# Patient Record
Sex: MALE | Race: WHITE | Employment: FULL TIME | URBAN - METROPOLITAN AREA
[De-identification: names, ages, dates, MRNs, and addresses within clinical notes are randomized per-mention and may not be internally consistent; named-entity substitution may affect disease eponyms.]

---

## 2018-05-02 ENCOUNTER — HOSPITAL ENCOUNTER (EMERGENCY)
Age: 28
Discharge: HOME | End: 2018-05-02

## 2018-05-02 VITALS
RESPIRATION RATE: 16 BRPM | HEIGHT: 71 IN | HEART RATE: 76 BPM | WEIGHT: 170 LBS | DIASTOLIC BLOOD PRESSURE: 82 MMHG | SYSTOLIC BLOOD PRESSURE: 138 MMHG | TEMPERATURE: 98.1 F | BODY MASS INDEX: 23.8 KG/M2 | OXYGEN SATURATION: 98 %

## 2018-05-02 DIAGNOSIS — K08.89 PAIN, DENTAL: Primary | ICD-10-CM

## 2018-05-02 PROCEDURE — 6370000000 HC RX 637 (ALT 250 FOR IP): Performed by: PHYSICIAN ASSISTANT

## 2018-05-02 PROCEDURE — 99282 EMERGENCY DEPT VISIT SF MDM: CPT

## 2018-05-02 RX ORDER — IBUPROFEN 800 MG/1
800 TABLET ORAL ONCE
Status: COMPLETED | OUTPATIENT
Start: 2018-05-02 | End: 2018-05-02

## 2018-05-02 RX ADMIN — IBUPROFEN 800 MG: 800 TABLET ORAL at 02:33

## 2018-05-02 ASSESSMENT — PAIN DESCRIPTION - FREQUENCY: FREQUENCY: CONTINUOUS

## 2018-05-02 ASSESSMENT — PAIN SCALES - GENERAL
PAINLEVEL_OUTOF10: 8
PAINLEVEL_OUTOF10: 8

## 2018-05-02 ASSESSMENT — PAIN DESCRIPTION - ORIENTATION: ORIENTATION: RIGHT;UPPER;LOWER

## 2018-05-02 ASSESSMENT — PAIN DESCRIPTION - PAIN TYPE: TYPE: ACUTE PAIN

## 2018-05-02 ASSESSMENT — PAIN DESCRIPTION - LOCATION: LOCATION: TEETH

## 2018-05-02 ASSESSMENT — PAIN DESCRIPTION - DESCRIPTORS: DESCRIPTORS: STABBING;SHARP;ACHING

## 2022-06-17 ENCOUNTER — APPOINTMENT (OUTPATIENT)
Dept: GENERAL RADIOLOGY | Age: 32
End: 2022-06-17
Payer: COMMERCIAL

## 2022-06-17 ENCOUNTER — HOSPITAL ENCOUNTER (EMERGENCY)
Age: 32
Discharge: HOME | End: 2022-06-17
Attending: EMERGENCY MEDICINE
Payer: COMMERCIAL

## 2022-06-17 VITALS
HEART RATE: 71 BPM | HEIGHT: 71 IN | DIASTOLIC BLOOD PRESSURE: 77 MMHG | RESPIRATION RATE: 12 BRPM | OXYGEN SATURATION: 98 % | SYSTOLIC BLOOD PRESSURE: 110 MMHG | TEMPERATURE: 98.1 F | BODY MASS INDEX: 26.32 KG/M2 | WEIGHT: 188 LBS

## 2022-06-17 DIAGNOSIS — S61.211A LACERATION OF LEFT INDEX FINGER WITHOUT FOREIGN BODY WITHOUT DAMAGE TO NAIL, INITIAL ENCOUNTER: Primary | ICD-10-CM

## 2022-06-17 PROCEDURE — 6360000002 HC RX W HCPCS: Performed by: EMERGENCY MEDICINE

## 2022-06-17 PROCEDURE — 73130 X-RAY EXAM OF HAND: CPT

## 2022-06-17 PROCEDURE — 12002 RPR S/N/AX/GEN/TRNK2.6-7.5CM: CPT

## 2022-06-17 PROCEDURE — 2500000003 HC RX 250 WO HCPCS: Performed by: EMERGENCY MEDICINE

## 2022-06-17 PROCEDURE — 6370000000 HC RX 637 (ALT 250 FOR IP): Performed by: EMERGENCY MEDICINE

## 2022-06-17 PROCEDURE — 2580000003 HC RX 258: Performed by: EMERGENCY MEDICINE

## 2022-06-17 PROCEDURE — 99284 EMERGENCY DEPT VISIT MOD MDM: CPT

## 2022-06-17 PROCEDURE — 96374 THER/PROPH/DIAG INJ IV PUSH: CPT

## 2022-06-17 RX ORDER — HYDROCODONE BITARTRATE AND ACETAMINOPHEN 5; 325 MG/1; MG/1
1 TABLET ORAL ONCE
Status: COMPLETED | OUTPATIENT
Start: 2022-06-17 | End: 2022-06-17

## 2022-06-17 RX ORDER — 0.9 % SODIUM CHLORIDE 0.9 %
1000 INTRAVENOUS SOLUTION INTRAVENOUS ONCE
Status: COMPLETED | OUTPATIENT
Start: 2022-06-17 | End: 2022-06-17

## 2022-06-17 RX ORDER — TETANUS AND DIPHTHERIA TOXOIDS ADSORBED 2; 2 [LF]/.5ML; [LF]/.5ML
0.5 INJECTION INTRAMUSCULAR ONCE
Status: DISCONTINUED | OUTPATIENT
Start: 2022-06-17 | End: 2022-06-17 | Stop reason: HOSPADM

## 2022-06-17 RX ORDER — LIDOCAINE HYDROCHLORIDE 10 MG/ML
5 INJECTION, SOLUTION INFILTRATION; PERINEURAL ONCE
Status: COMPLETED | OUTPATIENT
Start: 2022-06-17 | End: 2022-06-17

## 2022-06-17 RX ORDER — HYDROCODONE BITARTRATE AND ACETAMINOPHEN 5; 325 MG/1; MG/1
1 TABLET ORAL EVERY 6 HOURS PRN
Qty: 20 TABLET | Refills: 0 | Status: SHIPPED | OUTPATIENT
Start: 2022-06-17 | End: 2022-06-22

## 2022-06-17 RX ORDER — CEPHALEXIN 500 MG/1
500 CAPSULE ORAL 4 TIMES DAILY
Qty: 40 CAPSULE | Refills: 0 | Status: SHIPPED | OUTPATIENT
Start: 2022-06-17 | End: 2022-06-27

## 2022-06-17 RX ADMIN — HYDROCODONE BITARTRATE AND ACETAMINOPHEN 1 TABLET: 5; 325 TABLET ORAL at 09:48

## 2022-06-17 RX ADMIN — SODIUM CHLORIDE 1000 ML: 9 INJECTION, SOLUTION INTRAVENOUS at 09:49

## 2022-06-17 RX ADMIN — LIDOCAINE HYDROCHLORIDE 5 ML: 10 INJECTION, SOLUTION INFILTRATION; PERINEURAL at 09:50

## 2022-06-17 RX ADMIN — CEFAZOLIN 2000 MG: 2 INJECTION, POWDER, FOR SOLUTION INTRAMUSCULAR; INTRAVENOUS at 09:50

## 2022-06-17 ASSESSMENT — PAIN SCALES - GENERAL
PAINLEVEL_OUTOF10: 3

## 2022-06-17 ASSESSMENT — PAIN DESCRIPTION - ORIENTATION
ORIENTATION: LEFT
ORIENTATION: LEFT

## 2022-06-17 ASSESSMENT — PAIN DESCRIPTION - LOCATION
LOCATION: FINGER (COMMENT WHICH ONE)
LOCATION: FINGER (COMMENT WHICH ONE)

## 2022-06-17 ASSESSMENT — PAIN DESCRIPTION - FREQUENCY: FREQUENCY: CONTINUOUS

## 2022-06-17 ASSESSMENT — PAIN DESCRIPTION - PAIN TYPE: TYPE: ACUTE PAIN

## 2022-06-17 ASSESSMENT — PAIN DESCRIPTION - DESCRIPTORS
DESCRIPTORS: SORE
DESCRIPTORS: ACHING

## 2022-06-17 ASSESSMENT — PAIN - FUNCTIONAL ASSESSMENT
PAIN_FUNCTIONAL_ASSESSMENT: PREVENTS OR INTERFERES SOME ACTIVE ACTIVITIES AND ADLS
PAIN_FUNCTIONAL_ASSESSMENT: NONE - DENIES PAIN
PAIN_FUNCTIONAL_ASSESSMENT: 0-10

## 2022-06-17 ASSESSMENT — PAIN DESCRIPTION - ONSET: ONSET: ON-GOING

## 2022-06-17 NOTE — ED PROVIDER NOTES
HPI:  6/17/22, Time: 9:32 AM EDT         Johan Baker is a 27 y.o. male presenting to the ED for left index finger laceration (palmar surface) after getting it caught in a chain (large one) at work, beginning this morning. He is left-handed. He denies paresthesias or weakness, inability to move his finger or hand, any other complaint or injury at this time. He denies fingernail trauma. He denies allergy or intolerance to any medications. The complaint has been persistent, moderate in severity, and worsened by nothing. Patient denies any other complaints or injuries at this time. ROS:   A complete review of systems was performed and all pertinent positives and negatives are stated within HPI, all other systems reviewed and are negative.      --------------------------------------------- PAST HISTORY ---------------------------------------------  Past Medical History:  has no past medical history on file. Past Surgical History:  has no past surgical history on file. Social History:  reports that he has been smoking cigarettes. He has been smoking about 0.50 packs per day. He has never used smokeless tobacco. He reports current alcohol use. He reports that he does not use drugs. Family History: family history is not on file. The patients home medications have been reviewed. Allergies: Patient has no known allergies. ----------------------------------------PHYSICAL EXAM--------------------------------------  Constitutional:  Well developed, well nourished, no acute distress, non-toxic appearance   Eyes:  PERRL, conjunctiva normal, EOMI  HENT:  Atraumatic, external ears normal, nose normal, oropharynx moist. Neck- normal range of motion, no nuchal rigidity   Respiratory:  No respiratory distress   Cardiovascular:  Normal rate, normal rhythm. Radial pulses 2+ bilaterally. Left hand forearm and finger components warm and well perfused.   Components are soft, cap refill less than 3 seconds. Musculoskeletal:  No edema, no tenderness, no deformities. She has full range of motion of left wrist, hand, all finger DIP, PIP and MCP joints including left thumb IP joint and MCP joint without difficulty or pain. Fingernails intact without trauma. Integument:  Well hydrated. Adequate perfusion. Palmar aspect of left index finger with large semicircular shaped laceration creating a large skin flap 1-1/2 cm across by 2 cm in length that exposes subcu tissue when folded back. Flap of skin is dusky and pale. No exposed bone or tendon. Neurologic:  Alert & oriented x 3, CN 2-12 normal, no focal deficits noted. Normal gait. Left median, radial and ulnar nerves sensation intact to light touch and strength 5/5. Recurrent branch of left median nerve intact. Psychiatric:  Speech and behavior appropriate       -------------------------------------------------- RESULTS -------------------------------------------------  I have personally reviewed all laboratory and imaging results for this patient. Results are listed below. LABS:  No results found for this visit on 06/17/22. RADIOLOGY:  Interpreted by Radiologist.  XR HAND LEFT (MIN 3 VIEWS)   Final Result   Soft tissue laceration. No osseous findings. See above.             ------------------------- NURSING NOTES AND VITALS REVIEWED ---------------------------  The nursing notes within the ED encounter and vital signs as below have been reviewed by myself. /77   Pulse 71   Temp 98.1 °F (36.7 °C)   Resp 12   Ht 5' 11\" (1.803 m)   Wt 188 lb (85.3 kg)   SpO2 98%   BMI 26.22 kg/m²   Oxygen Saturation Interpretation: Normal      The patients available past medical records and past encounters were reviewed.         ------------------------------ ED COURSE/MEDICAL DECISION MAKING----------------------  Medications   diptheria-tetanus toxoids (DECAVAC) 2-2 LF/0.5ML injection 0.5 mL (0.5 mLs IntraMUSCular Not Given 6/17/22 3695) ceFAZolin (ANCEF) 2,000 mg in sterile water 20 mL IV syringe (2,000 mg IntraVENous Given 6/17/22 0950)   0.9 % sodium chloride bolus (0 mLs IntraVENous Stopped 6/17/22 1049)   HYDROcodone-acetaminophen (NORCO) 5-325 MG per tablet 1 tablet (1 tablet Oral Given 6/17/22 0948)   lidocaine 1 % injection 5 mL (5 mLs IntraDERmal Given 6/17/22 0950)           Procedures:     LACERATION REPAIR  PROCEDURE NOTE:  Unless otherwise indicated, this procedure was done or directly supervised by Patrick De La Rosa NP. Time: 11:30 AM EDT  Laceration #: 1. Location: left inde finger  Length: 3 cm. The wound area was cleansed with sterile saline, povidone iodine,and SkinTegrity shur-clens and draped in a sterile fashion. The wound area was anesthetized with Lidocaine 1% without epinephrine (ring block)  WOUND COMPLEXITY:  Debridement: None. Undermining: None. Wound Margins Revised: None. Flaps Aligned: yes. The wound was explored with the following results no foreign body or tendon injury seen. The wound was closed with 3-0 Ethilon using interrupted sutures. Dressing:  a bandage and splint was placed. Total number suture: 9        Medical Decision Making:    IV Ancef given, IV saline, PO norco for pain and tetanus updated. Digit and then splinted after sutured by nurse practitioner, see procedure note as above. Wound cleansed thoroughly by RN. He is neurovascularly intact. He will be written for off work until cleared by provider and sutures removed and wound well-healed. He will be discharged home with Keflex. He will also be discharged home with [place] for breakthrough pain with safe opiate use and driving restrictions reviewed. Wound care reviewed. Wound healing process also reviewed. Patient was explicitly instructed on specific signs and symptoms on which to return to the emergency room for. Patient was instructed to return to the ER for any new or worsening symptoms.  Additional discharge instructions were given verbally. All questions were answered. Patient is comfortable and agreeable with discharge plan. Patient in no acute distress and non-toxic in appearance. This patient's ED course included: re-evaluation prior to disposition, IV medications and a personal history and physicial eaxmination    This patient has remained hemodynamically stable, improved and been closely monitored during their ED course. Re-Evaluations:  Time: 12:12 PM   Re-evaluation. Patients symptoms are improving  Repeat physical examination is improved      Consultations:   none    Critical Care: none    I, Beba Segundo DO, am the Primary Provider of Record    Counseling: The emergency provider has spoken with the patient and spouse/SO and discussed todays results, in addition to providing specific details for the plan of care and counseling regarding the diagnosis and prognosis. Questions are answered at this time and they are agreeable with the plan.    --------------------------- IMPRESSION AND DISPOSITION ---------------------------------    IMPRESSION  1.  Laceration of left index finger without foreign body without damage to nail, initial encounter        DISPOSITION  Disposition: Discharge to home  Patient condition is stable             Susan Bonds DO  06/17/22 1212

## 2022-08-23 ENCOUNTER — HOSPITAL ENCOUNTER (OUTPATIENT)
Dept: OCCUPATIONAL THERAPY | Age: 32
Setting detail: THERAPIES SERIES
Discharge: HOME | End: 2022-08-23
Payer: COMMERCIAL

## 2022-08-23 PROCEDURE — 97165 OT EVAL LOW COMPLEX 30 MIN: CPT | Performed by: OCCUPATIONAL THERAPIST

## 2022-08-23 PROCEDURE — 97140 MANUAL THERAPY 1/> REGIONS: CPT | Performed by: OCCUPATIONAL THERAPIST

## 2022-08-23 NOTE — PROGRESS NOTES
OCCUPATIONAL THERAPY INITIAL EVALUATION    [facility] [address] THERAPY  [address] [address]  Dept: [address]: [phone]   [facility] Fax: [phone]    Date:  2022  Initial Evaluation Date: 22   Evaluating Therapist: Erin Bonilla OT    Patient Name:  Timmy Montilla    :  1990    Restrictions/Precautions:   Low fall risk  Diagnosis:  Laceration of Index Finger (S61.211A       Date of Surgery/Injury:     Insurance/Certification information:  [facility] C-9 (3x/week x 4 weeks) service dates 22 to 22  [facility]# [id number],  [facility]#: [id number]  Plan of care signed (Y/N): N  Visit# / total visits:     Referring Practitioner:  John Ragsdale DO  Specific Practitioner Orders: Occupational Therapy L Hand- Evaluate and Treat 3x/week for 4 weeks    Assessment of current deficits   [] Functional mobility  [] ADLs  [x] Strength   [] Cognition   [] Functional transfers   [x] IADLs  [] Safety Awareness  [] Endurance   [x] Fine Motor Coordination  [] Balance  [] Vision/perception  [x] Sensation    [] Gross Motor Coordination [x] ROM  [x] Pain   [x] Edema    [x] Scar Adhesion/Skin Integrity     OT PLAN OF CARE   OT POC based on physician orders, patient diagnosis and results of clinical assessment    Frequency/Duration: OT 2-3x/week or 12 visits  Specific OT Treatment to include:     [x] Instruction in HEP                   Modalities:  [x] Therapeutic Exercise        [] Ultrasound               [] Electrical Stimulation/Attended  [x] PROM/Stretching                    [x] Fluidotherapy          []  Paraffin                   [x] AAROM  [x] AROM                 [] Iontophoresis:   [x] Tendon Glides                                               [] Neuromuscular Re-Ed            [] ADL/IADL re-training    [x] Therapeutic Activity       [x] Pain Management with/without modalities PRN                 [x] Manual Therapy                      [] Splinting                      [x] Scar Management                   []Joint Protection/Training  []Ergonomics                             [x] Joint Mobilization        [] Adaptive Equipment Assessment/Training                             [x] Manual Edema Mobilization   [] Myofascial Release                 [x] Energy Conservation/Work Simplification  [x] GM/FM Coordination       [x] Safety retraining/education per  individual diagnosis/goals  [x] Desensitization       Patient Specific Goal: To be able to gasp and lift 100# using L hand                             GOALS (Long term same as Short term): 4 weeks  1) Patient will demonstrate good understanding of home program (exercises/activities/diagnosis/prognosis/goals) with good accuracy. 2) Patient will demonstrate increased active range of motion of their left hand to [facility] for ADL/IADL completion. 3) Patient will demonstrate increased /pinch strength of at least 40# / 5# lateral pinch of their Left hand for IADLs/work related tasks. 4) Patient to report decreased pain in their affected Left upper extremity from 7/10 to 2/10 or less with resistive functional use. 5) Patient to report a decrease in hypersensitivity from 80% to 20% or less in their Left IF. 6) Patient will be knowledgeable of edema control techniques as evident with decreases from mild to trace/none. 7) Patient will demonstrate a non-tender/non-adherent scar. Past Medical History: No past medical history on file. Past Surgical History: No past surgical history on file. Reason for Referral: Patient sustained injury/laceration to L hand IF on 6/17/22. His L IF got caught in a large chain at work. X-rays of left hand (-). The wound was explored and closed with 9 sutures. Patient has experienced persistent pain, hypersensitivity/paresthesia and decreased ROM L IF. He is now referred to OP OT to decrease pain/increase functional use L hand.  Patient is left dominant     Home Living: Patient lives w/ his family. Prior Level of Function: Prior to injury, patient was Independent w/ ADLs/IADLs/Work/Driving/Leisure. Cognition:   Alert/Oriented x3     IADL STATUS:   Ind Mod I Min A Mod A Max A Dep Other   Homemaking Responsibility  x        Shopping Responsibility:  x        Mode of Transportation: x         Leisure & Hobbies:     x     Work:  x     x     Comments: Patient reported that since his injury, he has been working in the office- able to complete tasks w/ modifications. He does not feel that he is strong enough to work in [employer] which involves heavy lifting/grasping. Patient is able to complete basic household tasks w/ modifications- cross dominance/limited use of index finger. Patient has not been able to resume leisure tasks- golf and bow hunting. ADL STATUS:   Ind Mod I Min A Mod A Max A Dep Other   Feeding: x         Grooming: x         Bathing: x         UE Dressing: x         LE Dressing: x         Toileting: x         Transfers: x           Comments: Patient is able to complete basic ADLs w/ modifications    Pain Level: Pain fluctuates but can be as high as 7/10, aching, throbbing, tight (pulling), uncomfortable, variable intensity, and with paresthesia    UE Assessment:  Hand Dominance is Left   LUE AROM: WNL except for  IF MP: 0-90*  IF PIP: 0-93*  IF: DIP 0-70*    Comment: Decreased hook fist d/t tightness    Sensation: Diminished protective sense lateral/distal aspect of scar, hypersensitivity medial aspect    Skin: Incision site tissue hard/brawny.  Decrease scar mobility    Tone: normal  Vision / Perception: WFL  Edema Description: Minimal edema left IF     Dynamometer (setting 2):     Left: 44# (Norm 110#)     Right: 136# (Norm 121#)   Pinch Meter:   Lateral: Left= 4#,Right= 15#    Palmar 3 point: Left= 14#, Right= 15#  9 Hole Peg Test:   Left: 24 seconds   Right: 22 seconds        Intervention: Instruction/training on tendon glides and there-putty exercises and certify that the services are required and that they will be provided while the patient is under my care. Physician's Comments/Revisions:                   Physicians's Printed Name:  Roula Bourne DO                                   Physician's Signature:                                                               Date:      Please review Patient's OT evaluation and if you agree sign/date and fax back to us at our [address] [facility] Fax: [phone].  Thank you for your referral!

## 2022-08-25 ENCOUNTER — HOSPITAL ENCOUNTER (OUTPATIENT)
Dept: OCCUPATIONAL THERAPY | Age: 32
Setting detail: THERAPIES SERIES
Discharge: HOME | End: 2022-08-25
Payer: COMMERCIAL

## 2022-08-25 PROCEDURE — 97140 MANUAL THERAPY 1/> REGIONS: CPT | Performed by: OCCUPATIONAL THERAPIST

## 2022-08-25 PROCEDURE — 97530 THERAPEUTIC ACTIVITIES: CPT | Performed by: OCCUPATIONAL THERAPIST

## 2022-08-25 PROCEDURE — 97110 THERAPEUTIC EXERCISES: CPT | Performed by: OCCUPATIONAL THERAPIST

## 2022-08-25 NOTE — PROGRESS NOTES
OCCUPATIONAL THERAPY PROGRESS NOTE    [facility] [address] THERAPY  [address] [address]  [address]: [address]: [phone]   [facility] Fax: [phone]    Date:  2022  Initial Evaluation Date: 22   Evaluating Therapist: Amor Still OT    Patient Name:  Joellen Patton    :  1990    Restrictions/Precautions:   Low fall risk  Diagnosis:  Laceration of Index Finger (S61.211A       Date of Surgery/Injury: 74    Insurance/Certification information:  [facility] C-9 (3x/week x 4 weeks) service dates 22 to 22  [facility]# [id number],  [facility]#: [id number]  Plan of care signed (Y/N): N  Visit# / total visits:     Referring Practitioner:  Nacho Mcknight DO  Specific Practitioner Orders: Occupational Therapy L Hand- Evaluate and Treat 3x/week for 4 weeks    Assessment of current deficits   [] Functional mobility  [] ADLs  [x] Strength   [] Cognition   [] Functional transfers   [x] IADLs  [] Safety Awareness  [] Endurance   [x] Fine Motor Coordination  [] Balance  [] Vision/perception  [x] Sensation    [] Gross Motor Coordination [x] ROM  [x] Pain   [x] Edema    [x] Scar Adhesion/Skin Integrity     OT PLAN OF CARE   OT POC based on physician orders, patient diagnosis and results of clinical assessment    Frequency/Duration: OT 2-3x/week or 12 visits  Specific OT Treatment to include:     [x] Instruction in HEP                   Modalities:  [x] Therapeutic Exercise        [] Ultrasound               [] Electrical Stimulation/Attended  [x] PROM/Stretching                    [x] Fluidotherapy          []  Paraffin                   [x] AAROM  [x] AROM                 [] Iontophoresis:   [x] Tendon Glides                                               [] Neuromuscular Re-Ed            [] ADL/IADL re-training    [x] Therapeutic Activity       [x] Pain Management with/without modalities PRN                 [x] Manual Therapy                      [] Splinting [x] Scar Management                   []Joint Protection/Training  []Ergonomics                             [x] Joint Mobilization        [] Adaptive Equipment Assessment/Training                             [x] Manual Edema Mobilization   [] Myofascial Release                 [x] Energy Conservation/Work Simplification  [x] GM/FM Coordination       [x] Safety retraining/education per  individual diagnosis/goals  [x] Desensitization       Patient Specific Goal: To be able to gasp and lift 100# using L hand                             GOALS (Long term same as Short term): 4 weeks  1) Patient will demonstrate good understanding of home program (exercises/activities/diagnosis/prognosis/goals) with good accuracy. 2) Patient will demonstrate increased active range of motion of their left hand to [facility] for ADL/IADL completion. 3) Patient will demonstrate increased /pinch strength of at least 40# / 5# lateral pinch of their Left hand for IADLs/work related tasks. 4) Patient to report decreased pain in their affected Left upper extremity from 7/10 to 2/10 or less with resistive functional use. 5) Patient to report a decrease in hypersensitivity from 80% to 20% or less in their Left IF. 6) Patient will be knowledgeable of edema control techniques as evident with decreases from mild to trace/none. 7) Patient will demonstrate a non-tender/non-adherent scar. Pain Level: 4 on scale of 1-10, aching, uncomfortable, and with paresthesia. Decreased hypersensitivity    Subjective: Patient reported that he is trying to decrease guarding of his IF     Objective:  Updated POC to be completed by 9/23/22. INTERVENTION: COMPLETED: SPECIFICS/COMMENTS:   Modality:               AROM:     Hand x Tendon glides- slight increase in L IF PIP flex.  Hook fist remains limited        AAROM:     Isolated IF x Blocked and unblocked MP, PIP, and DIP for IF   Hand x    PROM/Stretching:     Isloated IF x Scar Mass/Edema Control:     Manual edema mobilization x Completed throughout   Scar Management x Scar massage - circular and cross friction. Significant softening of tissue. Desensitization x Rolling over green thera-bar  Tapping w/ varied textures  Tossing/Catching hand to hand 3.3# ball  Therapy spheres x 5 minutes (clockwise/counterclockwise)   Strengthening:     Forearm/Wrist x Green Flex bar- Supination/Pronation/Twisting 15 reps x 2set   Hand X  X  x Green hand exerciser x 15 reps (individual digits)  Thera-putty tools- key turning, opening bottle , screw  x 5 min  Kneading, pushing, pulling 5# thera-putty x 5 minutes   Other:     HEP x 8/23/22 Issued medium thera-putty for grasping, rolling/pinching; tendon glides; scar massage, and desensitization          Assessment/Comments: Pt is making Good progress toward stated plan of care. Patient completing HEP w/ good follow through. Increased ROM for L IF PIP flex to 95*. Significant softening IF tissue. Patient tolerated various desensitization this date, but continues w/ hypersensitivity. Continued tightness for hook fist.  -Rehab Potential: Good    -Response to Treatment: Patient highly motivated to improve use L hand    Plan:   [x]  Continues Plan of care: Focus on L hand strength, ROM, scar management, and desensitization. Pt education continues at each visit to obtain maximum benefits from skilled OT intervention. []  Alter Plan of care:   []  Discharge:       Patient. Education:  [x] Plans/Goals, Risks/Benefits discussed  [x] Home exercise program  Method of Education: [x] Verbal  [x] Demo  [x] Written  Comprehension of Education:  [x] Verbalizes understanding. [x] Demonstrates understanding. [x] Needs Review. [] Demonstrates/verbalizes understanding of HEP/Ed previously given.             Time In:805 a            Time Out: 9 a             Visit #: 2    CODE  Time Minutes  Units   94940 Fluidotherapy      59910 Manual 805a-865i 20 1 75635 Therapeutic Ex 825a-850 25 2   34349 Therapeutic Activity 850a-9a 10 1   29341 ADL/COMP Tech Train      43866 Neuromuscular Re-Ed      78588 OrthoManagementTraining       Other       Total       55  2        Electronically signed by: Ernie Pineda, OTR/L; [id number]

## 2022-08-29 ENCOUNTER — HOSPITAL ENCOUNTER (OUTPATIENT)
Dept: OCCUPATIONAL THERAPY | Age: 32
Setting detail: THERAPIES SERIES
Discharge: HOME | End: 2022-08-29
Payer: COMMERCIAL

## 2022-08-29 PROCEDURE — 97140 MANUAL THERAPY 1/> REGIONS: CPT

## 2022-08-29 PROCEDURE — 97022 WHIRLPOOL THERAPY: CPT

## 2022-08-29 PROCEDURE — 97110 THERAPEUTIC EXERCISES: CPT

## 2022-08-29 NOTE — PROGRESS NOTES
OCCUPATIONAL THERAPY PROGRESS NOTE    [facility] [address] [facility]. THERAPY  [address] [facility] [address]  Dept: [address]: [phone]   GENEVIEVE OT Fax: [phone]    Date:  2022  Initial Evaluation Date: 22   Evaluating Therapist: LISA Rosales    Patient Name:  Chris Hubbard    :  1990    Restrictions/Precautions:   Low fall risk  Diagnosis:  Laceration of Index Finger (S61.211A       Date of Surgery/Injury: 99    Insurance/Certification information:  [facility] C-9 (3x/week x 4 weeks) service dates 22 to 22  [facility]# [id number],  [facility] [phone]  Plan of care signed (Y/N): N  Visit# / total visits: 3/12    Referring Practitioner:  Mimi Guajardo DO  Specific Practitioner Orders: Occupational Therapy L Hand- Evaluate and Treat 3x/week for 4 weeks    Assessment of current deficits   [] Functional mobility  [] ADLs  [x] Strength   [] Cognition   [] Functional transfers   [x] IADLs  [] Safety Awareness  [] Endurance   [x] Fine Motor Coordination  [] Balance  [] Vision/perception  [x] Sensation    [] Gross Motor Coordination [x] ROM  [x] Pain   [x] Edema    [x] Scar Adhesion/Skin Integrity     OT PLAN OF CARE   OT POC based on physician orders, patient diagnosis and results of clinical assessment    Frequency/Duration: OT 2-3x/week or 12 visits  Specific OT Treatment to include:     [x] Instruction in HEP                   Modalities:  [x] Therapeutic Exercise        [] Ultrasound               [] Electrical Stimulation/Attended  [x] PROM/Stretching                    [x] Fluidotherapy          []  Paraffin                   [x] AAROM  [x] AROM                 [] Iontophoresis:   [x] Tendon Glides                                               [] Neuromuscular Re-Ed            [] ADL/IADL re-training    [x] Therapeutic Activity       [x] Pain Management with/without modalities PRN                 [x] Manual Therapy                      [] Splinting [x] Scar Management                   []Joint Protection/Training  []Ergonomics                             [x] Joint Mobilization        [] Adaptive Equipment Assessment/Training                             [x] Manual Edema Mobilization   [] Myofascial Release                 [x] Energy Conservation/Work Simplification  [x] GM/FM Coordination       [x] Safety retraining/education per  individual diagnosis/goals  [x] Desensitization       Patient Specific Goal: To be able to gasp and lift 100# using L hand                             GOALS (Long term same as Short term): 4 weeks  1) Patient will demonstrate good understanding of home program (exercises/activities/diagnosis/prognosis/goals) with good accuracy. 2) Patient will demonstrate increased active range of motion of their left hand to [facility] for ADL/IADL completion. 3) Patient will demonstrate increased /pinch strength of at least 40# / 5# lateral pinch of their Left hand for IADLs/work related tasks. 4) Patient to report decreased pain in their affected Left upper extremity from 7/10 to 2/10 or less with resistive functional use. 5) Patient to report a decrease in hypersensitivity from 80% to 20% or less in their Left IF. 6) Patient will be knowledgeable of edema control techniques as evident with decreases from mild to trace/none. 7) Patient will demonstrate a non-tender/non-adherent scar. Pain Level: 4 on scale of 1-10, aching, uncomfortable, and with paresthesia. Decreased hypersensitivity    Subjective: Patient reports he is trying to include his index finger in all tasks but with some heavier work tasks that is hard to do because his finger hurts when it is involved. Objective:  Updated POC to be completed by 9/23/22. INTERVENTION: COMPLETED: SPECIFICS/COMMENTS:   Modality:               AROM:     Hand- digits, all planes x Tendon glides- slight increase in L IF PIP flex.  Hook fist limited but mobility hand. Pt trials in session with Cizer tools and reports it helps engage finger. Therapist completes strengthening hands on today which pt tolerates fairly well. Pts index finger mobility improves pre to post tx. Pt displays moderate sensitivity IF with resisted ROM. Edema level IF decreasing. Scar tissue thickness decreasing and pt will benefit from gel sleeve initiation. Will cont to progress pt as tolerated for improving left hand/IF function, ROM & strength. -Rehab Potential: Good    -Response to Treatment: Patient highly motivated to improve use L hand & happy to receive gel sleeve today to help promote IF engagement    Plan:   [x]  Continues Plan of care: Focus on L hand strength, ROM, scar management, and desensitization. Pt education continues at each visit to obtain maximum benefits from skilled OT intervention. []  Alter Plan of care:   []  Discharge:       Patient. Education:  [x] Plans/Goals, Risks/Benefits discussed  [x] Home exercise program  Method of Education: [x] Verbal  [x] Demo  [x] Written  Comprehension of Education:  [x] Verbalizes understanding. [x] Demonstrates understanding. [x] Needs Review. [] Demonstrates/verbalizes understanding of HEP/Ed previously given.             Time In:8 am            Time Out: 9 a             Visit #: 3    CODE  Time Minutes  Units   45839 Fluidotherapy 8am-8:15am 15 1   55482 Manual 8:15am-8:45am 30 2   78760 Therapeutic Ex 8:45am- 9am 15 1   61325 Therapeutic Activity      62733 ADL/COMP Tech Train      75122 Neuromuscular Re-Ed      24000 OrthoManagementTraining       Other       Total       60  2        Electronically signed by: Shayna Sloan, [id number]

## 2022-09-01 ENCOUNTER — HOSPITAL ENCOUNTER (OUTPATIENT)
Dept: OCCUPATIONAL THERAPY | Age: 32
Setting detail: THERAPIES SERIES
Discharge: HOME | End: 2022-09-01
Payer: COMMERCIAL

## 2022-09-01 PROCEDURE — 97022 WHIRLPOOL THERAPY: CPT

## 2022-09-01 PROCEDURE — 97110 THERAPEUTIC EXERCISES: CPT

## 2022-09-01 PROCEDURE — 97140 MANUAL THERAPY 1/> REGIONS: CPT

## 2022-09-01 NOTE — PROGRESS NOTES
OCCUPATIONAL THERAPY PROGRESS NOTE    Y   [facility],  [address] [place] [address] THERAPY  [address] [facility] [address]  Dept: [address]: [phone]   GENEVIEVE OT Fax: [phone]    Date:  2022  Initial Evaluation Date: 22   Evaluating Therapist: LISA Gibbons    Patient Name:  Uvaldo Brown    :  1990    Restrictions/Precautions:   Low fall risk  Diagnosis:  Laceration of Index Finger (S61.211A       Date of Surgery/Injury:     Insurance/Certification information:  [facility] C-9 (3x/week x 4 weeks) service dates 22 to 22- auth date extended until 10/7/22 per Sonu Barber (fax received)  [facility]# [id number],  [facility]#: [phone]  Plan of care signed (Y/N): N  Visit# / total visits:     Referring Practitioner:  Antone Dandy, DO  Specific Practitioner Orders: Occupational Therapy L Hand- Evaluate and Treat 3x/week for 4 weeks    Assessment of current deficits   [] Functional mobility  [] ADLs  [x] Strength   [] Cognition   [] Functional transfers   [x] IADLs  [] Safety Awareness  [] Endurance   [x] Fine Motor Coordination  [] Balance  [] Vision/perception  [x] Sensation    [] Gross Motor Coordination [x] ROM  [x] Pain   [x] Edema    [x] Scar Adhesion/Skin Integrity     OT PLAN OF CARE   OT POC based on physician orders, patient diagnosis and results of clinical assessment    Frequency/Duration: OT 2-3x/week or 12 visits  Specific OT Treatment to include:     [x] Instruction in HEP                   Modalities:  [x] Therapeutic Exercise        [] Ultrasound               [] Electrical Stimulation/Attended  [x] PROM/Stretching                    [x] Fluidotherapy          []  Paraffin                   [x] AAROM  [x] AROM                 [] Iontophoresis:   [x] Tendon Glides                                               [] Neuromuscular Re-Ed            [] ADL/IADL re-training    [x] Therapeutic Activity       [x] Pain Management with/without modalities PRN [x] Manual Therapy                      [] Splinting                      [x] Scar Management                   []Joint Protection/Training  []Ergonomics                             [x] Joint Mobilization        [] Adaptive Equipment Assessment/Training                             [x] Manual Edema Mobilization   [] Myofascial Release                 [x] Energy Conservation/Work Simplification  [x] GM/FM Coordination       [x] Safety retraining/education per  individual diagnosis/goals  [x] Desensitization       Patient Specific Goal: To be able to gasp and lift 100# using L hand                             GOALS (Long term same as Short term): 4 weeks  1) Patient will demonstrate good understanding of home program (exercises/activities/diagnosis/prognosis/goals) with good accuracy. 2) Patient will demonstrate increased active range of motion of their left hand to [facility] for ADL/IADL completion. 3) Patient will demonstrate increased /pinch strength of at least 40# / 5# lateral pinch of their Left hand for IADLs/work related tasks. 4) Patient to report decreased pain in their affected Left upper extremity from 7/10 to 2/10 or less with resistive functional use. 5) Patient to report a decrease in hypersensitivity from 80% to 20% or less in their Left IF. 6) Patient will be knowledgeable of edema control techniques as evident with decreases from mild to trace/none. 7) Patient will demonstrate a non-tender/non-adherent scar. Pain Level: 4 on scale of 1-10, aching, uncomfortable, and with paresthesia. Decreased hypersensitivity    Subjective: Patient reports \" I like that sleeve. It cushions my finger at work and I feel like I'm using my hand a little more. \"    Objective:  Updated POC to be completed by 9/23/22. INTERVENTION: COMPLETED: SPECIFICS/COMMENTS:   Modality:               AROM:     Hand- digits, all planes x Tendon glides- slight increase in L IF PIP flex.  Hook fist limited but mobility ^es pre to post tx        AAROM:     Isolated IF x Blocked and unblocked MP, PIP, and DIP for IF   Composite Hand tendon gliding positions- all 5 positions x    PROM/Stretching:     Isloated IF x Flexion & extension, all joints with focus PIP joint        Scar Mass/Edema Control:     Manual edema mobilization x Completed to IF intermittently throughout session   Scar Management x Scar massage - circular and cross friction. Significant softening of tissue. Silicone Gel Compression Sleeve X Therapist instructs pt to cont with large gel sleeve for edema mgmt & scar mgmt IF- desensitization. Therapist instructs pt to cont with gel sleeve for use during day to also promote engagement of IF with all daily & work tasks secondary to cushioning it provides to digit when fxly using hand   Desensitization X  X   Rolling over green thera-bar  Tapping w/ varied textures       Strengthening:     Forearm/Wrist x Green Flex bar- Supination/Pronation/Twisting 15 reps x 2set   Hand X  X                home Hands on resistive ex's ( ^ing aggressiveness) composite hand/digits, all planes  Isolated hands on resistive ex's to IF , all planes focus flexion PIP joint      Thera-putty tools- key turning, opening bottle , screw  x 5 min- pt completes with gel sleeve on and reports it ^es digit involvement    Kneading, pushing, pulling 5# thera-putty x 5 minutes   Other:     HEP x 8/23/22 Issued medium thera-putty for grasping, rolling/pinching; tendon glides; scar massage, and desensitization  0/13/91: silicone gel sleeve          Assessment/Comments: Pt is making Good progress toward stated plan of care. Patient arrives to therapy reporting the gel sleeve issued to him by therapist last session has really helped him incorporate use of his injured finger at work. with heavier grasping tasks. Therapist completes strengthening hands on tollowed with use of resistive tools which pt tolerates fairly well.  Pts index finger mobility improves pre to post tx. Pt displays min to moderate sensitivity IF with resisted ROM. Edema level IF decreasing. Scar tissue thickness decreasing. Pt reports less hypersensitivity in scar. Will cont to progress pt as tolerated for improving left hand/IF function, ROM & strength. -Rehab Potential: Good    -Response to Treatment: Patient highly motivated to improve use L hand. Pt reports gel sleeve helps promote IF engagement misael at work    Plan:   [x]  Continues Plan of care: Focus on L hand strength, ROM, scar management, and desensitization. Pt education continues at each visit to obtain maximum benefits from skilled OT intervention.   []  Alter Plan of care:   []  Discharge:       Time In:7am            Time Out: 8 am             Visit #: 4    CODE  Time Minutes  Units   44652 Fluidotherapy 7am-7:15am 15 1   22106 Manual 7:15am-7:45am 30 2   18210 Therapeutic Ex 7:45am-8am 15 1   37745 Therapeutic Activity      95251 ADL/COMP Tech Train      18896 Neuromuscular Re-Ed      69656 OrthoManagementTraining       Other       Total       60  2        Electronically signed by: Tyrone Diaz 46, [id number]

## 2022-09-07 ENCOUNTER — HOSPITAL ENCOUNTER (OUTPATIENT)
Dept: OCCUPATIONAL THERAPY | Age: 32
Setting detail: THERAPIES SERIES
Discharge: HOME | End: 2022-09-07
Payer: COMMERCIAL

## 2022-09-07 PROCEDURE — 97140 MANUAL THERAPY 1/> REGIONS: CPT

## 2022-09-07 PROCEDURE — 97110 THERAPEUTIC EXERCISES: CPT

## 2022-09-07 NOTE — PROGRESS NOTES
OCCUPATIONAL THERAPY PROGRESS NOTE    [facility]  MONY OCCUPATIONAL THERAPY  [facility] [address]  [address]: [address] : [phone]   GENEVIEVE OT Fax: [phone]    Date:  2022  Initial Evaluation Date: 22   Evaluating Therapist: [facility] LORRIE.  Aravind Mendieta    Patient Name:  Sonia Rudolph    :  1990    Restrictions/Precautions:   Low fall risk  Diagnosis:  Laceration of Index Finger (S61.211A       Date of Surgery/Injury: 85    Insurance/Certification information:  [facility] C-9 (3x/week x 4 weeks) service dates 22 to 22- auth date extended until 10/7/22 per Mikayla Vega (fax received)  [facility]# [id number],  [facility] [phone]  Plan of care signed (Y/N): N  Visit# / total visits:     Referring Practitioner:  Calin Rick DO  Specific Practitioner Orders: Occupational Therapy L Hand- Evaluate and Treat 3x/week for 4 weeks    Assessment of current deficits   [] Functional mobility  [] ADLs  [x] Strength   [] Cognition   [] Functional transfers   [x] IADLs  [] Safety Awareness  [] Endurance   [x] Fine Motor Coordination  [] Balance  [] Vision/perception  [x] Sensation    [] Gross Motor Coordination [x] ROM  [x] Pain   [x] Edema    [x] Scar Adhesion/Skin Integrity     OT PLAN OF CARE   OT POC based on physician orders, patient diagnosis and results of clinical assessment    Frequency/Duration: OT 2-3x/week or 12 visits  Specific OT Treatment to include:     [x] Instruction in HEP                   Modalities:  [x] Therapeutic Exercise        [] Ultrasound               [] Electrical Stimulation/Attended  [x] PROM/Stretching                    [x] Fluidotherapy          []  Paraffin                   [x] AAROM  [x] AROM                 [] Iontophoresis:   [x] Tendon Glides                                               [] Neuromuscular Re-Ed            [] ADL/IADL re-training    [x] Therapeutic Activity       [x] Pain Management with/without modalities PRN [x] Manual Therapy                      [] Splinting                      [x] Scar Management                   []Joint Protection/Training  []Ergonomics                             [x] Joint Mobilization        [] Adaptive Equipment Assessment/Training                             [x] Manual Edema Mobilization   [] Myofascial Release                 [x] Energy Conservation/Work Simplification  [x] GM/FM Coordination       [x] Safety retraining/education per  individual diagnosis/goals  [x] Desensitization       Patient Specific Goal: To be able to gasp and lift 100# using L hand                             GOALS (Long term same as Short term): 4 weeks  1) Patient will demonstrate good understanding of home program (exercises/activities/diagnosis/prognosis/goals) with good accuracy. 2) Patient will demonstrate increased active range of motion of their left hand to [facility] for ADL/IADL completion. 3) Patient will demonstrate increased /pinch strength of at least 40# / 5# lateral pinch of their Left hand for IADLs/work related tasks. 4) Patient to report decreased pain in their affected Left upper extremity from 7/10 to 2/10 or less with resistive functional use. 5) Patient to report a decrease in hypersensitivity from 80% to 20% or less in their Left IF. 6) Patient will be knowledgeable of edema control techniques as evident with decreases from mild to trace/none. 7) Patient will demonstrate a non-tender/non-adherent scar. Pain Level: 4 on scale of 1-10, aching, uncomfortable, and with paresthesia. Decreased hypersensitivity    Subjective: Patient reports \" My finger feels like it needs to crack all the time. \"    Objective:  Updated POC to be completed by 9/23/22. INTERVENTION: COMPLETED: SPECIFICS/COMMENTS:   Modality:               AROM:     Hand- digits, all planes x Tendon glides- slight increase in L IF PIP flex.  Hook fist limited but mobility ^es pre to post tx- completes start of session in [facility]:     Isolated IF x Blocked and unblocked MP, PIP, and DIP for IF   Composite Hand tendon gliding positions- all 5 positions x    PROM/Stretching:     Isloated IF x Flexion & extension, all joints with focus PIP joint        Scar Mass/Edema Control:     Manual edema mobilization x Completed to IF intermittently throughout session   Scar Management x Scar massage - circular and cross friction. Significant softening of tissue. Silicone Gel Compression Sleeve X Therapist instructs pt to cont with large gel sleeve for edema mgmt & scar mgmt IF- desensitization. Therapist instructs pt to cont with gel sleeve for use during day to also promote engagement of IF with all daily & work tasks secondary to cushioning it provides to digit when fxly using hand   Desensitization X  X   Rolling over green thera-bar  Tapping w/ varied textures       Strengthening:     Forearm/Wrist x Green Flex bar- Supination/Pronation/Twisting 15 reps x 2set   Hand X    X      X 5 mins           Hands on resistive ex's ( ^ing aggressiveness) composite hand/digits, all planes  Isolated hands on resistive ex's to IF , all planes focus flexion/extension PIP joint ( ^ing aggressiveness)    Richard Current therabar ex's added: Wringing, up & down bends        Thera-putty tools- key turning, opening bottle , screw  x 5 min- pt completes with gel sleeve on and reports it ^es digit involvement. Kneading, pushing, pulling x 5 minutes   Other:     HEP x 8/23/22 Issued medium thera-putty for grasping, rolling/pinching; tendon glides; scar massage, and desensitization  8/00/63: silicone gel sleeve          Assessment/Comments: Pt is making Good progress toward stated plan of care. Patient arrives to therapy reporting his sensation in his index finger is starting to \"feel different\". Pt also reports his finger feels like it has to crack all the time.  Therapist adds green therabar to strengthening ex's and also Valerie Borden with hands on resistive isolated index finger flexion & extension ex's. Pt reports > minimal discomfort with resisted PIP extension from a fisted position. Pts index finger mobility improves pre to post tx. Pt displays minimal sensitivity IF scar area. Edema level IF decreasing- slight. Scar tissue thickness decreasing. Pt conts to utilize gel sleeve during the day with good result. Will cont to progress pt as tolerated for improving left hand/IF function, ROM & strength. -Rehab Potential: Good    -Response to Treatment: Patient highly motivated to improve use L hand. Pt reports gel sleeve helps promote IF engagement misael at work    Plan:   [x]  Continues Plan of care: Focus on L hand strength, ROM, scar management, and desensitization. Pt education continues at each visit to obtain maximum benefits from skilled OT intervention.   []  Alter Plan of care:   []  Discharge:       Time In:7:15 am            Time Out: 8:05 am             Visit #: 5    CODE  Time Minutes  Units   94858 Fluidotherapy      10801 Manual 7:35am-8:05am 30 2   57459 Therapeutic Ex 7:15am-7:35am 20 1   72080 Therapeutic Activity      24438 ADL/COMP Tech Train      38885 Neuromuscular Re-Ed      63151 OrthoManagementTraining       Other       Total       50  3        Electronically signed by: Lavern Sicard COTA/L, [id number]

## 2022-09-08 ENCOUNTER — HOSPITAL ENCOUNTER (OUTPATIENT)
Dept: OCCUPATIONAL THERAPY | Age: 32
Setting detail: THERAPIES SERIES
Discharge: HOME | End: 2022-09-08
Payer: COMMERCIAL

## 2022-09-08 PROCEDURE — 97140 MANUAL THERAPY 1/> REGIONS: CPT

## 2022-09-08 PROCEDURE — 97110 THERAPEUTIC EXERCISES: CPT

## 2022-09-08 NOTE — PROGRESS NOTES
OCCUPATIONAL THERAPY PROGRESS NOTE    [facility]CCUPATIONAL [facility]  [facility]  [facility] [address]  Dept: [address] : [phone]   [facility] Fax: [phone]    Date:  2022  Initial Evaluation Date: 22   Evaluating Therapist: Cong Colon.  Michelle Thompson    Patient Name:  Cathy Bravo    :  1990    Restrictions/Precautions:   Low fall risk  Diagnosis:  Laceration of Index Finger (S61.211A       Date of Surgery/Injury:     Insurance/Certification information:  [facility] C-9 (3x/week x 4 weeks) service dates 22 to 22- auth date extended until 10/7/22 per Ramirez Cedeno (fax received)  [facility]# [id number],  O#: [id number]  Plan of care signed (Y/N): N  Visit# / total visits:     Referring Practitioner:  Delmis Delgado DO  Specific Practitioner Orders: Occupational Therapy L Hand- Evaluate and Treat 3x/week for 4 weeks    Assessment of current deficits   [] Functional mobility  [] ADLs  [x] Strength   [] Cognition   [] Functional transfers   [x] IADLs  [] Safety Awareness  [] Endurance   [x] Fine Motor Coordination  [] Balance  [] Vision/perception  [x] Sensation    [] Gross Motor Coordination [x] ROM  [x] Pain   [x] Edema    [x] Scar Adhesion/Skin Integrity     OT PLAN OF CARE   OT POC based on physician orders, patient diagnosis and results of clinical assessment    Frequency/Duration: OT 2-3x/week or 12 visits  Specific OT Treatment to include:     [x] Instruction in HEP                   Modalities:  [x] Therapeutic Exercise        [] Ultrasound               [] Electrical Stimulation/Attended  [x] PROM/Stretching                    [x] Fluidotherapy          []  Paraffin                   [x] AAROM  [x] AROM                 [] Iontophoresis:   [x] Tendon Glides                                               [] Neuromuscular Re-Ed            [] ADL/IADL re-training    [x] Therapeutic Activity       [x] Pain Management with/without modalities PRN [x] Manual Therapy                      [] Splinting                      [x] Scar Management                   []Joint Protection/Training  []Ergonomics                             [x] Joint Mobilization        [] Adaptive Equipment Assessment/Training                             [x] Manual Edema Mobilization   [] Myofascial Release                 [x] Energy Conservation/Work Simplification  [x] GM/FM Coordination       [x] Safety retraining/education per  individual diagnosis/goals  [x] Desensitization       Patient Specific Goal: To be able to gasp and lift 100# using L hand                             GOALS (Long term same as Short term): 4 weeks  1) Patient will demonstrate good understanding of home program (exercises/activities/diagnosis/prognosis/goals) with good accuracy. 2) Patient will demonstrate increased active range of motion of their left hand to [facility] for ADL/IADL completion. 3) Patient will demonstrate increased /pinch strength of at least 40# / 5# lateral pinch of their Left hand for IADLs/work related tasks. 4) Patient to report decreased pain in their affected Left upper extremity from 7/10 to 2/10 or less with resistive functional use. 5) Patient to report a decrease in hypersensitivity from 80% to 20% or less in their Left IF. 6) Patient will be knowledgeable of edema control techniques as evident with decreases from mild to trace/none. 7) Patient will demonstrate a non-tender/non-adherent scar. Pain Level: 4 on scale of 1-10, aching, uncomfortable, and with paresthesia. Decreased hypersensitivity    Subjective: Patient reports \" I have a numb spot on that scar when I'm rolling it on this ball. \"    Objective:  Updated POC to be completed by 9/23/22. INTERVENTION: COMPLETED: SPECIFICS/COMMENTS:   Modality:               AROM:     Hand- digits, all planes x Tendon glides- slight increase in L IF PIP flex.  Hook fist limited but mobility ^es pre to post tx- completes start of session in Fluido        AAROM:     Isolated IF x Blocked and unblocked MP, PIP, and DIP for IF   Composite Hand tendon gliding positions- all 5 positions x    PROM/Stretching:     Isloated IF x Flexion & extension, all joints with focus PIP joint        Scar Mass/Edema Control:     Manual edema mobilization x Completed to IF intermittently throughout session   Scar Management x Scar massage - circular and cross friction. Significant softening of tissue. Silicone Gel Compression Sleeve X Therapist instructs pt to cont with large gel sleeve for edema mgmt & scar mgmt IF- desensitization. Therapist instructs pt to cont with gel sleeve for use during day to also promote engagement of IF with all daily & work tasks secondary to cushioning it provides to digit when fxly using hand   Desensitization X  X   Rolling over green thera-bar, textured ball  Tapping w/ varied textures       Strengthening:     Forearm/Wrist x Green Flex bar- Supination/Pronation/Twisting 15 reps x 2set   Hand X       3 X 10 reps each direction today        X 5 mins      X 4 mins     Hands on resistive ex's ( ^ing aggressiveness) composite hand/digits, all planes    Isolated hands on resistive ex's to IF , all planes focus flexion/extension PIP joint ( ^ing aggressiveness). . > min diff for pt to complete ex's          Ribera Redig therabar ex's: Wringing, up & down bends    3# hand ball for tosses & catches for impact desensitization and palming with fingertips for grasp strength       Other:     HEP x 8/23/22 Issued medium thera-putty for grasping, rolling/pinching; tendon glides; scar massage, and desensitization  7/30/73: silicone gel sleeve          Assessment/Comments: Pt is making Good progress toward stated plan of care. Patient arrives to therapy motivated for tasks. Pt reports numbness in center of volar finger scar today with textured ball ex's \" I cant feel this spot in the middle but I can feel all around it\".  Therapist adds textured 3# hand ball to todays strengthening ex's and also Eleer Roma aggressiveness with hands on resistive isolated index finger flexion & extension ex's. Pt reports > minimal discomfort with resisted PIP extension from a fisted position. Pts index finger mobility improves pre to post tx. Pt displays minimal sensitivity IF scar area and reports center of scar numbness. Edema level IF decreasing- slight. Scar tissue thickness decreasing. Pt conts to utilize gel sleeve during the day with good result. Will cont to progress pt as tolerated for improving left hand/IF function, ROM & strength. Dynamometer (setting 2):    9/08/22                          Left: 44#         (Norm 110#)     improved ^100# on 9/08/22                      Right: 136#     (Norm 121#)    -Rehab Potential: Good    -Response to Treatment: Patient highly motivated to improve use L hand. Pt reports gel sleeve helps promote IF engagement misael at work    Plan:   [x]  Continues Plan of care: Focus on L hand strength, ROM, scar management, and desensitization. Pt education continues at each visit to obtain maximum benefits from skilled OT intervention.   []  Alter Plan of care:   []  Discharge:       Time In:7:15 am            Time Out: 8:05 am             Visit #: 6    CODE  Time Minutes  Units   82648 Fluidotherapy      90214 Manual 7:35am-8:05am 30 2   64536 Therapeutic Ex 7:15am-7:35am 20 1   64847 Therapeutic Activity      01908 ADL/COMP Tech Train      57520 Neuromuscular Re-Ed      28143 OrthoManagementTraining       Other       Total       50  3        Electronically signed by: Zelda MONTIEL/SHERYL, [id number]

## 2022-09-12 ENCOUNTER — HOSPITAL ENCOUNTER (OUTPATIENT)
Dept: OCCUPATIONAL THERAPY | Age: 32
Setting detail: THERAPIES SERIES
Discharge: HOME | End: 2022-09-12
Payer: COMMERCIAL

## 2022-09-12 NOTE — PROGRESS NOTES
Occupational Therapy      Phone: [phone] Fax: [phone]     Occupational Therapy   Cancellation Note     Patient Name:  Dandre Tena  : 1990  Date:  2022  MRN: [id number]    For today's appointment patient:   [x]  Cancelled   []  Rescheduled appointment   []  No-show     Reason given by patient:   []  Patient ill   []  Conflicting appointment   []  No transportation   []  Conflict with work   []  No reason given   []  Other:    Comments: Pt is scheduled for Thursday    Electronically signed by: French GÓMEZ, [id number]

## 2022-09-15 ENCOUNTER — HOSPITAL ENCOUNTER (OUTPATIENT)
Dept: OCCUPATIONAL THERAPY | Age: 32
Setting detail: THERAPIES SERIES
End: 2022-09-15
Payer: COMMERCIAL

## 2023-10-26 ENCOUNTER — HOSPITAL ENCOUNTER (OUTPATIENT)
Dept: OCCUPATIONAL THERAPY | Age: 33
Setting detail: THERAPIES SERIES
Discharge: HOME | End: 2023-10-26